# Patient Record
Sex: FEMALE | Race: BLACK OR AFRICAN AMERICAN
[De-identification: names, ages, dates, MRNs, and addresses within clinical notes are randomized per-mention and may not be internally consistent; named-entity substitution may affect disease eponyms.]

---

## 2019-10-08 ENCOUNTER — HOSPITAL ENCOUNTER (EMERGENCY)
Dept: HOSPITAL 97 - ER | Age: 84
Discharge: HOME | End: 2019-10-08
Payer: COMMERCIAL
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
## 2019-10-08 DIAGNOSIS — S92.511A: Primary | ICD-10-CM
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
## 2019-10-08 PROCEDURE — 99283 EMERGENCY DEPT VISIT LOW MDM: CPT

## 2019-10-08 NOTE — EDPHYS
Physician Documentation                                                                           

 [facility]                                                                 

Name: Ai Hurt                                                                                

Age: 85 yrs                                                                                       

Sex: Female                                                                                       

: 1933                                                                                   

MRN: [id number]                                                                                   

Arrival Date: 10/08/2019                                                                          

Time: 16:20                                                                                       

Account#: [id number]                                                                            

Bed 19                                                                                            

Private MD:                                                                                       

ED Physician Rogers Fong                                                                      

HPI:                                                                                              

10/08                                                                                             

17:18 This 85 yrs old Black Female presents to ER via Wheelchair with complaints of Toe       kb  

      Injury.                                                                                     

17:21 The patient presents with an injury, pain, that is acute, swelling, tenderness. The     kb  

      complaints affect the right second toe. Context: The problem was sustained at home,         

      resulted from stubbing toe on furniture. the patient can fully bear weight, the patient     

      is able to ambulate. Onset: The symptoms/episode began/occurred 2 day(s) ago. Modifying     

      factors: The symptoms are alleviated by nothing, the symptoms are aggravated by             

      nothing. Associated signs and symptoms: Pertinent positives: swelling. Severity of          

      symptoms: At their worst the symptoms were moderate, in the emergency department the        

      symptoms are unchanged. The patient has not experienced similar symptoms in the past.       

      The patient has not recently seen a physician.                                              

                                                                                                  

Historical:                                                                                       

- Allergies:                                                                                      

16:23 No Known Allergies;                                                                     la1 

- PMHx:                                                                                           

16:23 Hypertension;                                                                           la1 

                                                                                                  

- Immunization history:: Adult Immunizations up to date.                                          

- Social history:: Smoking status: Patient/guardian denies using tobacco.                         

- Ebola Screening: : No symptoms or risks identified at this time.                                

                                                                                                  

                                                                                                  

ROS:                                                                                              

17:20 Constitutional: Negative for fever, chills, and weight loss, Cardiovascular: Negative   kb  

      for chest pain, palpitations, and edema, Respiratory: Negative for shortness of breath,     

      cough, wheezing, and pleuritic chest pain, Abdomen/GI: Negative for abdominal pain,         

      nausea, vomiting, diarrhea, and constipation, Skin: Negative for injury, rash, and          

      discoloration, Neuro: Negative for headache, weakness, numbness, tingling, and seizure.     

17:20 MS/extremity: Positive for pain, swelling, tenderness.                                      

                                                                                                  

Exam:                                                                                             

17:20 Constitutional:  This is a well developed, well nourished patient who is awake, alert,  kb  

      and in no acute distress. Head/Face:  Normocephalic, atraumatic. Neck:  Trachea             

      midline, no thyromegaly or masses palpated, and no cervical lymphadenopathy.  Supple,       

      full range of motion without nuchal rigidity, or vertebral point tenderness.  No            

      Meningismus. Chest/axilla:  Normal chest wall appearance and motion.  Nontender with no     

      deformity.  No lesions are appreciated. Cardiovascular:  Regular rate and rhythm with a     

      normal S1 and S2.  No gallops, murmurs, or rubs.  Normal PMI, no JVD.  No pulse             

      deficits. Respiratory:  Lungs have equal breath sounds bilaterally, clear to                

      auscultation and percussion.  No rales, rhonchi or wheezes noted.  No increased work of     

      breathing, no retractions or nasal flaring. Abdomen/GI:  Soft, non-tender, with normal      

      bowel sounds.  No distension or tympany.  No guarding or rebound.  No evidence of           

      tenderness throughout. Skin:  Warm, dry with normal turgor.  Normal color with no           

      rashes, no lesions, and no evidence of cellulitis. Neuro:  Awake and alert, GCS 15,         

      oriented to person, place, time, and situation.  Cranial nerves II-XII grossly intact.      

      Motor strength 5/5 in all extremities.  Sensory grossly intact.  Cerebellar exam            

      normal.  Normal gait.                                                                       

17:20 Musculoskeletal/extremity: Extremities: grossly normal except: noted in the right           

      second toe: pain, swelling, tenderness, ROM: intact in all extremities, Circulation is      

      intact in all extremities. Sensation intact. Weight bearing: able to fully bear weight.     

                                                                                                  

Vital Signs:                                                                                      

16:23  / 89; Pulse 78; Resp 16; Temp 98.3; Pulse Ox 96% on R/A; Weight 74.84 kg; Height la1 

      5 ft. 8 in. (172.72 cm);                                                                    

18:17  / 78; Pulse 76; Resp 18; Pulse Ox 97% on R/A;                                    ph  

16:23 Body Mass Index 25.09 (74.84 kg, 172.72 cm)                                             la1 

                                                                                                  

MDM:                                                                                              

16:26 Patient medically screened.                                                             kb  

17:15 Data reviewed: vital signs, nurses notes. Data interpreted: Pulse oximetry: on room air kb  

      is 96 %. Interpretation: normal. Counseling: I had a detailed discussion with the           

      patient and/or guardian regarding: the historical points, exam findings, and any            

      diagnostic results supporting the discharge/admit diagnosis, radiology results, the         

      need for outpatient follow up, a family practitioner, to return to the emergency            

      department if symptoms worsen or persist or if there are any questions or concerns that     

      arise at home.                                                                              

                                                                                                  

10/08                                                                                             

16:39 Order name: Foot Right 3 View XRAY; Complete Time: 18:08                                kb  

10/08                                                                                             

17:16 Order name: Post-op shoe; Complete Time: 18:05                                          kb  

                                                                                                  

Administered Medications:                                                                         

No medications were administered                                                                  

                                                                                                  

                                                                                                  

Disposition:                                                                                      

10/09                                                                                             

05:53 Co-signature as Attending Physician, Rogers Fong MD I agree with the assessment and  mark 

      plan of care.                                                                               

                                                                                                  

Disposition:                                                                                      

10/08/19 17:18 Discharged to Home. Impression: Displaced fracture of proximal phalanx of right    

  lesser toe(s) - second toe.                                                                     

- Condition is Stable.                                                                            

- Discharge Instructions: Toe Fracture, Easy-to-Read.                                             

- Prescriptions for Diclofenac Sodium 75 mg Oral Tablet, Delayed Release (E.C.) - take            

  1 tablet by ORAL route 2 times per day As needed; 30 tablet.                                    

- Medication Reconciliation Form, Thank You Letter, Antibiotic Education, Prescription            

  Opioid Use form.                                                                                

- Follow up: Emergency Department; When: As needed; Reason: Worsening of condition.               

  Follow up: Private Physician; When: 2 - 3 days; Reason: Recheck today's complaints,             

  Continuance of care, Re-evaluation by your physician.                                           

                                                                                                  

                                                                                                  

                                                                                                  

Signatures:                                                                                       

Dispatcher [employer]                           EDMS                                                 

Dang Poole, FNP-C                 NIDHIP-Rogers Mcclelland MD MD cha Attema, Lee RN                         RN   laNeelam Valadez RN                      RN   ph                                                   

                                                                                                  

Corrections: (The following items were deleted from the chart)                                    

10/08                                                                                             

18:18 17:18 10/08/2019 17:18 Discharged to Home. Impression: Displaced fracture of proximal   ph  

      phalanx of right lesser toe(s) - second toe. Condition is Stable. Forms are Medication      

      Reconciliation Form, Thank You Letter, Antibiotic Education, Prescription Opioid Use.       

      Follow up: Emergency Department; When: As needed; Reason: Worsening of condition.           

      Follow up: Private Physician; When: 2 - 3 days; Reason: Recheck today's complaints,         

      Continuance of care, Re-evaluation by your physician. kb                                    

                                                                                                  

**************************************************************************************************

## 2019-10-08 NOTE — ER
Nurse's Notes                                                                                     

 [facility]                                                                 

Name: Ai Hurt                                                                                

Age: 85 yrs                                                                                       

Sex: Female                                                                                       

: 1933                                                                                   

MRN: [id number]                                                                                   

Arrival Date: 10/08/2019                                                                          

Time: 16:20                                                                                       

Account#: [id number]                                                                            

Bed 19                                                                                            

Private MD:                                                                                       

Diagnosis: Displaced fracture of proximal phalanx of right lesser toe(s)-second toe               

                                                                                                  

Presentation:                                                                                     

10/08                                                                                             

16:23 Presenting complaint: Patient states: I hit my toes on my right foot on a box on the    la1 

      floor. Transition of care: patient was not received from another setting of care. Onset     

      of symptoms was 2019. Risk Assessment: Do you want to hurt yourself or          

      someone else? Patient reports no desire to harm self or others. Initial Sepsis Screen:      

      Does the patient meet any 2 criteria? No. Patient's initial sepsis screen is negative.      

      Does the patient have a suspected source of infection? No. Patient's initial sepsis         

      screen is negative. Care prior to arrival: None.                                            

16:23 Method Of Arrival: Wheelchair                                                           la1 

16:23 Acuity: GINNY 4                                                                           la1 

                                                                                                  

Historical:                                                                                       

- Allergies:                                                                                      

16:23 No Known Allergies;                                                                     la1 

- PMHx:                                                                                           

16:23 Hypertension;                                                                           la1 

                                                                                                  

- Immunization history:: Adult Immunizations up to date.                                          

- Social history:: Smoking status: Patient/guardian denies using tobacco.                         

- Ebola Screening: : No symptoms or risks identified at this time.                                

                                                                                                  

                                                                                                  

Screenin:36 Abuse screen: Denies threats or abuse. Denies injuries from another. Nutritional        ph  

      screening: No deficits noted. Tuberculosis screening: No symptoms or risk factors           

      identified. Fall Risk None identified.                                                      

                                                                                                  

Assessment:                                                                                       

16:36 General: Appears in no apparent distress. comfortable, well groomed, Behavior is calm,  ph  

      cooperative, appropriate for age. Pain: Complains of pain in right second toe, right        

      third toe and right fourth toe. Neuro: Level of Consciousness is awake, alert, obeys        

      commands, Oriented to person, place, time, situation. Cardiovascular: Capillary refill      

      < 3 seconds in bilateral fingers Patient's skin is warm and dry. Respiratory: Airway is     

      patent Respiratory effort is even, unlabored, Respiratory pattern is regular,               

      symmetrical. Derm: Skin is intact, Skin is pink, warm \T\ dry. Musculoskeletal:             

      Circulation, motion, and sensation intact. Range of motion: intact in all extremities,      

      Swelling present in right second toe, right third toe and right fourth toe.                 

                                                                                                  

Vital Signs:                                                                                      

16:23  / 89; Pulse 78; Resp 16; Temp 98.3; Pulse Ox 96% on R/A; Weight 74.84 kg; Height la1 

      5 ft. 8 in. (172.72 cm);                                                                    

18:17  / 78; Pulse 76; Resp 18; Pulse Ox 97% on R/A;                                    ph  

16:23 Body Mass Index 25.09 (74.84 kg, 172.72 cm)                                             la1 

                                                                                                  

ED Course:                                                                                        

16:20 Patient arrived in ED.                                                                  mr  

16:23 Triage completed.                                                                       la1 

16:24 Arm band placed on left wrist.                                                          la1 

16:25 [facility]                                                        kb  

16:25 Rogers Fong MD is Attending Physician.                                             kb  

16:35 Neelam Coles, RN is Primary Nurse.                                                    ph  

17:04 Patient has correct armband on for positive identification. Bed in low position. Call   ph  

      light in reach. Side rails up X 1.                                                          

17:04 No provider procedures requiring assistance completed. Patient did not have IV access   ph  

      during this emergency room visit.                                                           

17:10 Foot Right 3 View XRAY In Process Unspecified.                                          EDMS

18:06 Ortho shoe applied to right foot.                                                       mh5 

                                                                                                  

Administered Medications:                                                                         

No medications were administered                                                                  

                                                                                                  

                                                                                                  

Outcome:                                                                                          

17:18 Discharge ordered by MD.                                                                kb  

18:15 Discharged to home ambulatory, with family.                                             ph  

18:15 Condition: good                                                                             

18:15 Discharge instructions given to patient, family, Instructed on discharge instructions,      

      follow up and referral plans. medication usage, Demonstrated understanding of               

      instructions, follow-up care, medications, Prescriptions given X 1.                         

18:18 Patient left the ED.                                                                    ph  

                                                                                                  

Signatures:                                                                                       

Dispatcher [employer]                           EDMS                                                 

Dang Poole FNP-C FNP-Ckb                                                   

Lynda Ugalde, Jose C, RN                         RN   laNeelam Valadez, Paulette Casas RN, ph                              [facility]                                                  

                                                                                                  

**************************************************************************************************

## 2019-10-08 NOTE — RAD REPORT
EXAM DESCRIPTION:  RAD - Foot Right 3 View - 10/8/2019 5:13 pm

 

CLINICAL HISTORY:  Foot pain, blunt force trauma to the toes

 

COMPARISON:  None.

 

FINDINGS:  An oblique fracture is present through the shaft and head of the second proximal phalanx. 
No significant distraction or angulation deformity. Fracture line does extend into the PIP joint. No 
other fracture changes are present. Old fracture changes are present in the fifth metatarsal. Bunion 
deformity is present at first MTP joint. There are prominent soft tissues along the medial margin. No
 soft tissue calcification or bone erosion. Degenerative changes are present at the tarsal metatarsal
 articulation. This is most prominent along the dorsal margin. Small plantar spur is present.

 

No air or foreign body in the soft tissues.

 

IMPRESSION:  Fracture of the second proximal phalanx as detailed. No significant distraction or angul
ation.

 

Additional degenerative changes and old trauma changes are present as detailed.